# Patient Record
Sex: MALE | Race: WHITE | URBAN - METROPOLITAN AREA
[De-identification: names, ages, dates, MRNs, and addresses within clinical notes are randomized per-mention and may not be internally consistent; named-entity substitution may affect disease eponyms.]

---

## 2017-05-18 ENCOUNTER — THERAPY VISIT (OUTPATIENT)
Dept: PHYSICAL THERAPY | Facility: CLINIC | Age: 57
End: 2017-05-18
Payer: OTHER MISCELLANEOUS

## 2017-05-18 DIAGNOSIS — Z98.890 S/P SHOULDER SURGERY: ICD-10-CM

## 2017-05-18 DIAGNOSIS — M25.512 ACUTE PAIN OF LEFT SHOULDER: Primary | ICD-10-CM

## 2017-05-18 PROCEDURE — 97110 THERAPEUTIC EXERCISES: CPT | Mod: GP | Performed by: PHYSICAL THERAPIST

## 2017-05-18 PROCEDURE — 97161 PT EVAL LOW COMPLEX 20 MIN: CPT | Mod: GP | Performed by: PHYSICAL THERAPIST

## 2017-05-18 ASSESSMENT — ENCOUNTER SYMPTOMS: NECK PAIN: 0

## 2017-05-18 NOTE — LETTER
[facility]  [address]  [place] [address]  [phone]    May 25, 2017    Re: Joby Crawford   :   1960  MRN:  [id number]   REFERRING PHYSICIAN:   [facility]    Date of Initial Evaluation:  17  Visits:  Rxs Used: 1  Reason for Referral:     Acute pain of left shoulder  S/P shoulder surgery    EVALUATION SUMMARY    [facility] Initial Evaluation    Subjective:  HPI Comments: ANGELIKA 62/100     Patient is a 57 year old male presenting with rehab left shoulder hpi. The history is provided by the patient. No  was used.   Joby Crawford is a 57 year old male with a left shoulder condition.  Condition occurred with:  A fall.  Condition occurred: at work.  This is a new condition  17, surgery date. Initial fall in 2016 .    Patient reports pain:  Anterior.  Radiates to:  Shoulder.  Pain is described as aching and is intermittent and reported as 7/10.  Associated symptoms:  Loss of motion/stiffness, painful arc, loss of strength and edema. Pain is the same all the time.  Symptoms are exacerbated by using arm behind back, using arm overhead, using arm at shoulder level, lifting, lying on extremity and carrying and relieved by rest and ice.  Since onset symptoms are unchanged.  Special tests:  X-ray and MRI.      General health as reported by patient is good.  Pertinent medical history includes:  Smoking.  Medical allergies: no.  Other surgeries include:  None reported.  Current medications:  None as reported by the patient.  Current occupation is  .  Patient is currently not working due to present treatment problem.  Primary job tasks include:  Prolonged sitting, prolonged standing, lifting, driving, operating a machine and repetitive tasks.    Barriers include:  None as reported by the patient.  Red flags:  None as reported by the patient.    Objective:  Standing Alignment:     Cervical/Thoracic:  Normal and forward head  Shoulder/UE:  Protracted scapula L, scapular winging L, rounded shoulders and depressed scapula L  Neurological: He is alert. He has normal strength and normal reflexes. No cranial nerve deficit or sensory deficit.     Shoulder Evaluation:  ROM:  Strength:  not assessed  Special Tests:  not assessed  Palpation:    Left shoulder tenderness present at:  Incisional  Review of Systems   Musculoskeletal: Negative for neck pain.     Assessment/Plan:    Patient is a 57 year old male with left side shoulder complaints.    Patient has the following significant findings with corresponding treatment plan.                Diagnosis 1:  Massive L RCR     Therapy Evaluation Codes:   1) History comprised of:   Personal factors that impact the plan of care:      Age, Profession, Time since onset of symptoms and Work status.    Comorbidity factors that impact the plan of care are:      Smoking.     Medications impacting care: Pain.  2) Examination of Body Systems comprised of:   Body structures and functions that impact the plan of care:      Shoulder.   Activity limitations that impact the plan of care are:      Bathing, Bending, Cooking, Driving, Dressing, Lifting, Reading/Computer work, Working, Sleeping and Laying down.  3) Clinical presentation characteristics are:   Stable/Uncomplicated.  4) Decision-Making    Low complexity using standardized patient assessment instrument and/or measureable assessment of functional outcome.  Cumulative Therapy Evaluation is: Low complexity.  Previous and current functional limitations:  (See Goal Flow Sheet for this information)    Short term and Long term goals: (See Goal Flow Sheet for this information)     Communication ability:  Patient appears to be able to clearly communicate and understand verbal and written communication and follow directions correctly.  Treatment Explanation - The following has been discussed with the patient:   RX  ordered/plan of care  Anticipated outcomes  Possible risks and side effects  This patient would benefit from PT intervention to resume normal activities.   Rehab potential is good.    Frequency:  1 X week, once daily  Duration:  for 8 weeks  Discharge Plan:  Achieve all LTG.  Independent in home treatment program.  Reach maximal therapeutic benefit.    Thank you for your referral.    INQUIRIES  Therapist: Jose L Herrera, PT, ATC  [facility]  [address]  [place] [address]  Phone: [phone]  Fax: [phone]

## 2017-05-18 NOTE — PROGRESS NOTES
[facility] Initial Evaluation      Subjective:    HPI Comments: ANGELIKA 62/100     Patient is a 57 year old male presenting with rehab left shoulder hpi. The history is provided by the patient. No  was used.   Joby Crawford is a 57 year old male with a left shoulder condition.  Condition occurred with:  A fall.  Condition occurred: at work.  This is a new condition  2/22/17, surgery date. Initial fall in December 2016 .    Patient reports pain:  Anterior.  Radiates to:  Shoulder.  Pain is described as aching and is intermittent and reported as 7/10.  Associated symptoms:  Loss of motion/stiffness, painful arc, loss of strength and edema. Pain is the same all the time.  Symptoms are exacerbated by using arm behind back, using arm overhead, using arm at shoulder level, lifting, lying on extremity and carrying and relieved by rest and ice.  Since onset symptoms are unchanged.  Special tests:  X-ray and MRI.      General health as reported by patient is good.  Pertinent medical history includes:  Smoking.  Medical allergies: no.  Other surgeries include:  None reported.  Current medications:  None as reported by the patient.  Current occupation is  .  Patient is currently not working due to present treatment problem.  Primary job tasks include:  Prolonged sitting, prolonged standing, lifting, driving, operating a machine and repetitive tasks.    Barriers include:  None as reported by the patient.    Red flags:  None as reported by the patient.                        Objective:    Standing Alignment:    Cervical/Thoracic:  Normal and forward head  Shoulder/UE:  Protracted scapula L, scapular winging L, rounded shoulders and depressed scapula L                    Neurological: He is alert. He has normal strength and normal reflexes. No cranial nerve deficit or sensory deficit.                      Shoulder Evaluation:  ROM:          Strength:  not  assessed                        Special Tests:  not assessed      Palpation:    Left shoulder tenderness present at:  Incisional                                       General     Review of Systems   Musculoskeletal: Negative for neck pain.       Assessment/Plan:      Patient is a 57 year old male with left side shoulder complaints.    Patient has the following significant findings with corresponding treatment plan.                Diagnosis 1:  Massive L RCR       Therapy Evaluation Codes:   1) History comprised of:   Personal factors that impact the plan of care:      Age, Profession, Time since onset of symptoms and Work status.    Comorbidity factors that impact the plan of care are:      Smoking.     Medications impacting care: Pain.  2) Examination of Body Systems comprised of:   Body structures and functions that impact the plan of care:      Shoulder.   Activity limitations that impact the plan of care are:      Bathing, Bending, Cooking, Driving, Dressing, Lifting, Reading/Computer work, Working, Sleeping and Laying down.  3) Clinical presentation characteristics are:   Stable/Uncomplicated.  4) Decision-Making    Low complexity using standardized patient assessment instrument and/or measureable assessment of functional outcome.  Cumulative Therapy Evaluation is: Low complexity.    Previous and current functional limitations:  (See Goal Flow Sheet for this information)    Short term and Long term goals: (See Goal Flow Sheet for this information)     Communication ability:  Patient appears to be able to clearly communicate and understand verbal and written communication and follow directions correctly.  Treatment Explanation - The following has been discussed with the patient:   RX ordered/plan of care  Anticipated outcomes  Possible risks and side effects  This patient would benefit from PT intervention to resume normal activities.   Rehab potential is good.    Frequency:  1 X week, once daily  Duration:  for  8 weeks  Discharge Plan:  Achieve all LTG.  Independent in home treatment program.  Reach maximal therapeutic benefit.    Please refer to the daily flowsheet for treatment today, total treatment time and time spent performing 1:1 timed codes.

## 2017-05-25 ENCOUNTER — THERAPY VISIT (OUTPATIENT)
Dept: PHYSICAL THERAPY | Facility: CLINIC | Age: 57
End: 2017-05-25
Payer: OTHER MISCELLANEOUS

## 2017-05-25 DIAGNOSIS — M25.512 ACUTE PAIN OF LEFT SHOULDER: Primary | ICD-10-CM

## 2017-05-25 DIAGNOSIS — Z98.890 S/P SHOULDER SURGERY: ICD-10-CM

## 2017-05-25 PROCEDURE — 97110 THERAPEUTIC EXERCISES: CPT | Mod: GP | Performed by: PHYSICAL THERAPIST

## 2017-05-31 ENCOUNTER — THERAPY VISIT (OUTPATIENT)
Dept: PHYSICAL THERAPY | Facility: CLINIC | Age: 57
End: 2017-05-31
Payer: OTHER MISCELLANEOUS

## 2017-05-31 DIAGNOSIS — M25.512 ACUTE PAIN OF LEFT SHOULDER: Primary | ICD-10-CM

## 2017-05-31 DIAGNOSIS — Z98.890 S/P SHOULDER SURGERY: ICD-10-CM

## 2017-05-31 PROCEDURE — 97112 NEUROMUSCULAR REEDUCATION: CPT | Mod: GP | Performed by: PHYSICAL THERAPIST

## 2017-05-31 PROCEDURE — 97110 THERAPEUTIC EXERCISES: CPT | Mod: GP | Performed by: PHYSICAL THERAPIST

## 2017-06-07 ENCOUNTER — THERAPY VISIT (OUTPATIENT)
Dept: PHYSICAL THERAPY | Facility: CLINIC | Age: 57
End: 2017-06-07
Payer: OTHER MISCELLANEOUS

## 2017-06-07 DIAGNOSIS — Z98.890 S/P SHOULDER SURGERY: ICD-10-CM

## 2017-06-07 DIAGNOSIS — M25.512 ACUTE PAIN OF LEFT SHOULDER: Primary | ICD-10-CM

## 2017-06-07 PROCEDURE — 97112 NEUROMUSCULAR REEDUCATION: CPT | Mod: GP | Performed by: PHYSICAL THERAPIST

## 2017-06-07 PROCEDURE — 97110 THERAPEUTIC EXERCISES: CPT | Mod: GP | Performed by: PHYSICAL THERAPIST

## 2017-06-07 NOTE — PROGRESS NOTES
Subjective:    HPI                    Objective:    System    Physical Exam    General     ROS    Assessment/Plan:      PROGRESS  REPORT    Progress reporting period is from May 18, 2017 to June 7, 2017. 4 visits.      SUBJECTIVE   16 weeks post op Massive RCR . Overall responding well. Limited from reaching overhead, sleeping through the night and heavy lifting yet. Patient has not RTW as a  yet.    Patient notes of gradual gains of function since DC from sling.       Current Pain level: 1/10   Initial Pain level: 8/10   Changes in function: Yes, see goal flow sheet for change in function   Adverse reactions: None;   ,         OBJECTIVE  Objective: Assisted flexion 120, ER 30-40, IR 70. Advancing with post op Program appropriatey at the 4 month román. Emphasis is pain management and slow progression of ROM restoration overhead and 3-4x/week light anti gravity strengthening.       ASSESSMENT/PLAN  Updated problem list and treatment plan: Diagnosis 1:  L Massive RCR 2/2/17    STG/LTGs have been met or progress has been made towards goals:  Yes (See Goal flow sheet completed today.)  Assessment of Progress: The patient's condition is improving.  The patient's condition has potential to improve.  Self Management Plans:  Patient has been instructed in a home treatment program.  I have re-evaluated this patient and find that the nature, scope, duration and intensity of the therapy is appropriate for the medical condition of the patient.    The patient is returning to your office for a recheck appointment.    Recommendations:  This patient would benefit from continued therapy.     Frequency:  1 X week, to every other week once daily  Duration:  for 6 weeks      This patient would benefit from further evaluation.    Please refer to the daily flowsheet for treatment today, total treatment time and time spent performing 1:1 timed codes.

## 2017-06-07 NOTE — LETTER
[facility]  [address]  [place] [address]  [phone]    2017    Re: Joby Crawford   :   1960  MRN:  [id number]   REFERRING PHYSICIAN:   [facility]    Date of Initial Evaluation:  17  Visits:  Rxs Used: 4  Reason for Referral:     Acute pain of left shoulder  S/P shoulder surgery    PROGRESS  REPORT  Progress reporting period is from May 18, 2017 to 2017. 4 visits.      SUBJECTIVE   16 weeks post op Massive RCR . Overall responding well. Limited from reaching overhead, sleeping through the night and heavy lifting yet. Patient has not RTW as a  yet.    Patient notes of gradual gains of function since DC from sling.     Current Pain level: 1/10   Initial Pain level: 810   Changes in function: Yes, see goal flow sheet for change in function   Adverse reactions: None;   ,         OBJECTIVE  Objective: Assisted flexion 120, ER 30-40, IR 70. Advancing with post op Program appropriatey at the 4 month román. Emphasis is pain management and slow progression of ROM restoration overhead and 3-4x/week light anti gravity strengthening.       ASSESSMENT/PLAN  Updated problem list and treatment plan: Diagnosis 1:  L Massive RCR 17    STG/LTGs have been met or progress has been made towards goals:  Yes (See Goal flow sheet completed today.)  Assessment of Progress: The patient's condition is improving.  The patient's condition has potential to improve.  Self Management Plans:  Patient has been instructed in a home treatment program.  I have re-evaluated this patient and find that the nature, scope, duration and intensity of the therapy is appropriate for the medical condition of the patient.    The patient is returning to your office for a recheck appointment.    Recommendations:  This patient would benefit from continued therapy.     Frequency:  1 X week, to every other week once daily  Duration:  for 6 weeks    This  patient would benefit from further evaluation.    Thank you for your referral.    INQUIRIES  Therapist: Jose L Herrera [facility]  [address] [place] [address]  Phone: [phone]  Fax: [phone]

## 2017-06-14 ENCOUNTER — THERAPY VISIT (OUTPATIENT)
Dept: PHYSICAL THERAPY | Facility: CLINIC | Age: 57
End: 2017-06-14
Payer: OTHER MISCELLANEOUS

## 2017-06-14 DIAGNOSIS — Z98.890 S/P SHOULDER SURGERY: ICD-10-CM

## 2017-06-14 DIAGNOSIS — M25.512 ACUTE PAIN OF LEFT SHOULDER: Primary | ICD-10-CM

## 2017-06-14 PROCEDURE — 97110 THERAPEUTIC EXERCISES: CPT | Mod: GP | Performed by: PHYSICAL THERAPIST

## 2017-06-14 PROCEDURE — 97112 NEUROMUSCULAR REEDUCATION: CPT | Mod: GP | Performed by: PHYSICAL THERAPIST

## 2017-06-21 ENCOUNTER — THERAPY VISIT (OUTPATIENT)
Dept: PHYSICAL THERAPY | Facility: CLINIC | Age: 57
End: 2017-06-21
Payer: OTHER MISCELLANEOUS

## 2017-06-21 DIAGNOSIS — M25.512 ACUTE PAIN OF LEFT SHOULDER: Primary | ICD-10-CM

## 2017-06-21 DIAGNOSIS — Z98.890 S/P SHOULDER SURGERY: ICD-10-CM

## 2017-06-21 PROCEDURE — 97110 THERAPEUTIC EXERCISES: CPT | Mod: GP | Performed by: PHYSICAL THERAPIST

## 2017-06-21 PROCEDURE — 97112 NEUROMUSCULAR REEDUCATION: CPT | Mod: GP | Performed by: PHYSICAL THERAPIST

## 2017-06-28 ENCOUNTER — THERAPY VISIT (OUTPATIENT)
Dept: PHYSICAL THERAPY | Facility: CLINIC | Age: 57
End: 2017-06-28
Payer: OTHER MISCELLANEOUS

## 2017-06-28 DIAGNOSIS — M25.512 ACUTE PAIN OF LEFT SHOULDER: Primary | ICD-10-CM

## 2017-06-28 DIAGNOSIS — Z98.890 S/P SHOULDER SURGERY: ICD-10-CM

## 2017-06-28 PROCEDURE — 97110 THERAPEUTIC EXERCISES: CPT | Mod: GP | Performed by: PHYSICAL THERAPIST

## 2017-06-28 PROCEDURE — 97112 NEUROMUSCULAR REEDUCATION: CPT | Mod: GP | Performed by: PHYSICAL THERAPIST

## 2017-07-05 ENCOUNTER — THERAPY VISIT (OUTPATIENT)
Dept: PHYSICAL THERAPY | Facility: CLINIC | Age: 57
End: 2017-07-05
Payer: OTHER MISCELLANEOUS

## 2017-07-05 DIAGNOSIS — M25.512 ACUTE PAIN OF LEFT SHOULDER: Primary | ICD-10-CM

## 2017-07-05 DIAGNOSIS — Z98.890 S/P SHOULDER SURGERY: ICD-10-CM

## 2017-07-05 PROCEDURE — 97112 NEUROMUSCULAR REEDUCATION: CPT | Mod: GP | Performed by: PHYSICAL THERAPIST

## 2017-07-05 PROCEDURE — 97110 THERAPEUTIC EXERCISES: CPT | Mod: GP | Performed by: PHYSICAL THERAPIST

## 2017-07-05 NOTE — PROGRESS NOTES
Subjective:    HPI                    Objective:    System    Physical Exam    General     ROS    Assessment/Plan:      PROGRESS  REPORT    Progress reporting period is from June 7, 2017 to July 5, 2017.      SUBJECTIVE  Subjective: 5 months post op . SPADI improved from 65/100 to 43/100 now. Pain level, relatively low. SIgnificant limit with strength and confidence to use the L arm without protective function.      Current Pain level: 1/10   Initial Pain level: 8/10   Changes in function: Yes, see goal flow sheet for change in function   Adverse reactions: None;   ,     Patient has failed to return to therapy so current objective findings are unknown.    OBJECTIVE  Objective: Flexion 145, ER 50, IR 65.     Modified Phase 4-5 months with endurance strength mode. following Massive RCR .     Appears that patient is roughly 50-60% physiologically recovered from surgery, ROM overhead is 85-90%, IR 90%, ER 50-60% compared to the opposite side.     Overall strength at 3/5  compared to the opposite side.       ASSESSMENT/PLAN  Updated problem list and treatment plan: Diagnosis 1:  L Massive RCR     STG/LTGs have been met or progress has been made towards goals:  Yes (See Goal flow sheet completed today.)  Assessment of Progress: The patient's condition is improving.  The patient's condition has potential to improve.  Self Management Plans:  Patient has been instructed in a home treatment program.  I have re-evaluated this patient and find that the nature, scope, duration and intensity of the therapy is appropriate for the medical condition of the patient.    The patient is returning to your office for a recheck appointment.    Most likely will predict roughly 10-12% gain of function, strength and mobility each month based on the history of recovery to this stage.     Recommendations:  This patient would benefit from continued therapy.     Frequency:  1 X week, up to every other week based on follow through outside of  therapy office, once daily  Duration:  for 8 weeks         Please refer to the daily flowsheet for treatment today, total treatment time and time spent performing 1:1 timed codes.

## 2017-07-05 NOTE — LETTER
[facility]  [address]  [place] [address]  [phone]    2017    Re: Joby Crawford   :   1960  MRN:  [id number]   REFERRING PHYSICIAN:   [facility]    Date of Initial Evaluation:  17  Visits:  Rxs Used: 8  Reason for Referral:     Acute pain of left shoulder  S/P shoulder surgery    PROGRESS  REPORT  Progress reporting period is from 2017 to 2017.      SUBJECTIVE  Subjective: 5 months post op . SPADI improved from 65/100 to 43/100 now. Pain level, relatively low. SIgnificant limit with strength and confidence to use the L arm without protective function.    Current Pain level: 1/10   Initial Pain level: 8/10   Changes in function: Yes, see goal flow sheet for change in function   Adverse reactions: None;   ,     Patient has failed to return to therapy so current objective findings are unknown.    OBJECTIVE  Objective: Flexion 145, ER 50, IR 65.   Modified Phase 4-5 months with endurance strength mode. following Massive RCR .   Appears that patient is roughly 50-60% physiologically recovered from surgery, ROM overhead is 85-90%, IR 90%, ER 50-60% compared to the opposite side.   Overall strength at 3/5  compared to the opposite side.       ASSESSMENT/PLAN  Updated problem list and treatment plan: Diagnosis 1:  L Massive RCR     STG/LTGs have been met or progress has been made towards goals:  Yes (See Goal flow sheet completed today.)  Assessment of Progress: The patient's condition is improving.  The patient's condition has potential to improve.  Self Management Plans:  Patient has been instructed in a home treatment program.  I have re-evaluated this patient and find that the nature, scope, duration and intensity of the therapy is appropriate for the medical condition of the patient.  The patient is returning to your office for a recheck appointment.  Most likely will predict roughly 10-12% gain of function,  strength and mobility each month based on the history of recovery to this stage.     Recommendations:  This patient would benefit from continued therapy.     Frequency:  1 X week, up to every other week based on follow through outside of therapy office, once daily  Duration:  for 8 weeks     Thank you for your referral.    INQUIRIES  Therapist: Jose L Herrera [facility]  [address]  [place] [address]  Phone: [phone]  Fax: [phone]

## 2017-07-26 ENCOUNTER — THERAPY VISIT (OUTPATIENT)
Dept: PHYSICAL THERAPY | Facility: CLINIC | Age: 57
End: 2017-07-26
Payer: OTHER MISCELLANEOUS

## 2017-07-26 DIAGNOSIS — Z98.890 S/P SHOULDER SURGERY: ICD-10-CM

## 2017-07-26 DIAGNOSIS — M25.512 ACUTE PAIN OF LEFT SHOULDER: Primary | ICD-10-CM

## 2017-07-26 PROCEDURE — 97112 NEUROMUSCULAR REEDUCATION: CPT | Mod: GP | Performed by: PHYSICAL THERAPIST

## 2017-07-26 PROCEDURE — 97110 THERAPEUTIC EXERCISES: CPT | Mod: GP | Performed by: PHYSICAL THERAPIST

## 2017-07-31 ENCOUNTER — TELEPHONE (OUTPATIENT)
Dept: PHYSICAL THERAPY | Facility: CLINIC | Age: 57
End: 2017-07-31

## 2017-08-02 ENCOUNTER — THERAPY VISIT (OUTPATIENT)
Dept: PHYSICAL THERAPY | Facility: CLINIC | Age: 57
End: 2017-08-02
Payer: OTHER MISCELLANEOUS

## 2017-08-02 DIAGNOSIS — M25.512 ACUTE PAIN OF LEFT SHOULDER: Primary | ICD-10-CM

## 2017-08-02 PROCEDURE — 97110 THERAPEUTIC EXERCISES: CPT | Mod: GP | Performed by: PHYSICAL THERAPIST

## 2017-08-02 PROCEDURE — 97140 MANUAL THERAPY 1/> REGIONS: CPT | Mod: GP | Performed by: PHYSICAL THERAPIST

## 2017-08-16 ENCOUNTER — THERAPY VISIT (OUTPATIENT)
Dept: PHYSICAL THERAPY | Facility: CLINIC | Age: 57
End: 2017-08-16
Payer: OTHER MISCELLANEOUS

## 2017-08-16 DIAGNOSIS — M25.512 ACUTE PAIN OF LEFT SHOULDER: Primary | ICD-10-CM

## 2017-08-16 DIAGNOSIS — Z98.890 S/P SHOULDER SURGERY: ICD-10-CM

## 2017-08-16 PROCEDURE — 97110 THERAPEUTIC EXERCISES: CPT | Mod: GP | Performed by: PHYSICAL THERAPIST

## 2017-08-16 PROCEDURE — 97112 NEUROMUSCULAR REEDUCATION: CPT | Mod: GP | Performed by: PHYSICAL THERAPIST

## 2017-08-16 NOTE — PROGRESS NOTES
Subjective:    HPI                    Objective:    System    Physical Exam    General     ROS    Assessment/Plan:      PROGRESS  REPORT    Progress reporting period is from July 5, 2017 to August 16, 2017.     SUBJECTIVE  Subjective: 6 months post op L Massive  RCR.     Follow through therapy every other week for gradual progression of endurance strength and gentle 4 corner ROM  ROM is very slow to restore.      SPADI 46/100, roughly the same as 1 month ago.       Current Pain level: 1/10   Initial Pain level: 8/10   Changes in function: Yes, see goal flow sheet for change in function   Adverse reactions: None;   ,         OBJECTIVE  Objective: IR 80-90, ER 45, Flexion 150-155,, posterior reach T8. weak with discomffort ABduction, IR, and ER.  Roughly 10% increase of total shoulder ROM compared to a month ago.     HEP: gentle 4 corner ROM daily.  , supine RTC program with emphasis to RTC high reps 3x/week      ASSESSMENT/PLAN  Updated problem list and treatment plan: Diagnosis 1:  L shoulder MAssive RCR       Assessment of Progress: The patient's progress has slowed.  Self Management Plans:  Patient has been instructed in a home treatment program.    The patient is returning to your office for a recheck appointment.    Recommendations:  This patient would benefit from further evaluation.    Please refer to the daily flowsheet for treatment today, total treatment time and time spent performing 1:1 timed codes.

## 2017-08-16 NOTE — LETTER
[facility]  [address] [place] [id number] [phone]    2017    Re: Joby Crawford   :   1960  MRN:  [id number]   REFERRING PHYSICIAN:   [facility]    Date of Initial Evaluation:  17  Visits:  Rxs Used: 3 ()  Reason for Referral:     Acute pain of left shoulder  S/P shoulder surgery    PROGRESS  REPORT  Progress reporting period is from 2017 to 2017.     SUBJECTIVE  Subjective: 6 months post op L Massive  RCR.   Follow through therapy every other week for gradual progression of endurance strength and gentle 4 corner ROM  ROM is very slow to restore.    SPADI 46/100, roughly the same as 1 month ago.     Current Pain level: 1/10   Initial Pain level: 8/10   Changes in function: Yes, see goal flow sheet for change in function   Adverse reactions: None    OBJECTIVE  Objective: IR 80-90, ER 45, Flexion 150-155,, posterior reach T8. weak with discomffort ABduction, IR, and ER.  Roughly 10% increase of total shoulder ROM compared to a month ago.   HEP: gentle 4 corner ROM daily.  supine RTC program with emphasis to RTC high reps 3x/week      ASSESSMENT/PLAN  Updated problem list and treatment plan: Diagnosis 1:  L shoulder MAssive RCR   Assessment of Progress: The patient's progress has slowed.  Self Management Plans:  Patient has been instructed in a home treatment program.  The patient is returning to your office for a recheck appointment.    Recommendations:  This patient would benefit from further evaluation.    Thank you for your referral.    INQUIRIES  Therapist: Jose L Herrera PT [facility]  [address]  [place] [address]  Phone: [phone]  Fax: [phone]

## 2017-08-23 ENCOUNTER — THERAPY VISIT (OUTPATIENT)
Dept: PHYSICAL THERAPY | Facility: CLINIC | Age: 57
End: 2017-08-23
Payer: OTHER MISCELLANEOUS

## 2017-08-23 DIAGNOSIS — Z98.890 S/P SHOULDER SURGERY: ICD-10-CM

## 2017-08-23 DIAGNOSIS — M25.512 ACUTE PAIN OF LEFT SHOULDER: Primary | ICD-10-CM

## 2017-08-23 PROCEDURE — 97140 MANUAL THERAPY 1/> REGIONS: CPT | Mod: GP | Performed by: PHYSICAL THERAPIST

## 2017-08-23 PROCEDURE — 97112 NEUROMUSCULAR REEDUCATION: CPT | Mod: GP | Performed by: PHYSICAL THERAPIST

## 2017-08-23 PROCEDURE — 97110 THERAPEUTIC EXERCISES: CPT | Mod: GP | Performed by: PHYSICAL THERAPIST

## 2017-08-28 ENCOUNTER — THERAPY VISIT (OUTPATIENT)
Dept: PHYSICAL THERAPY | Facility: CLINIC | Age: 57
End: 2017-08-28
Payer: OTHER MISCELLANEOUS

## 2017-08-28 DIAGNOSIS — M25.512 ACUTE PAIN OF LEFT SHOULDER: Primary | ICD-10-CM

## 2017-08-28 DIAGNOSIS — Z98.890 S/P SHOULDER SURGERY: ICD-10-CM

## 2017-08-28 PROCEDURE — 97112 NEUROMUSCULAR REEDUCATION: CPT | Mod: GP | Performed by: PHYSICAL THERAPIST

## 2017-08-28 PROCEDURE — 97110 THERAPEUTIC EXERCISES: CPT | Mod: GP | Performed by: PHYSICAL THERAPIST

## 2017-09-13 ENCOUNTER — THERAPY VISIT (OUTPATIENT)
Dept: PHYSICAL THERAPY | Facility: CLINIC | Age: 57
End: 2017-09-13
Payer: OTHER MISCELLANEOUS

## 2017-09-13 DIAGNOSIS — M25.512 ACUTE PAIN OF LEFT SHOULDER: Primary | ICD-10-CM

## 2017-09-13 DIAGNOSIS — Z98.890 S/P SHOULDER SURGERY: ICD-10-CM

## 2017-09-13 PROCEDURE — 97112 NEUROMUSCULAR REEDUCATION: CPT | Mod: GP | Performed by: PHYSICAL THERAPIST

## 2017-09-13 PROCEDURE — 97110 THERAPEUTIC EXERCISES: CPT | Mod: GP | Performed by: PHYSICAL THERAPIST

## 2017-09-13 PROCEDURE — 97140 MANUAL THERAPY 1/> REGIONS: CPT | Mod: GP | Performed by: PHYSICAL THERAPIST

## 2017-09-27 ENCOUNTER — THERAPY VISIT (OUTPATIENT)
Dept: PHYSICAL THERAPY | Facility: CLINIC | Age: 57
End: 2017-09-27
Payer: OTHER MISCELLANEOUS

## 2017-09-27 DIAGNOSIS — M25.512 ACUTE PAIN OF LEFT SHOULDER: Primary | ICD-10-CM

## 2017-09-27 DIAGNOSIS — Z98.890 S/P SHOULDER SURGERY: ICD-10-CM

## 2017-09-27 PROCEDURE — 97110 THERAPEUTIC EXERCISES: CPT | Mod: GP | Performed by: PHYSICAL THERAPIST

## 2017-09-27 PROCEDURE — 97112 NEUROMUSCULAR REEDUCATION: CPT | Mod: GP | Performed by: PHYSICAL THERAPIST

## 2017-09-27 NOTE — PROGRESS NOTES
Subjective:    HPI                    Objective:    System    Physical Exam    General     ROS    Assessment/Plan:      PROGRESS  REPORT    Progress reporting period is from August 16, 2017  to September 27, 2017.     SUBJECTIVE    6-7 months post op Massive RCR . Overall significant improvement with day to day activity. Weak and limited overhead reach, backside reach  and repeated lifting or heavy lifting above 20# patient reports.       Current Pain level: 0/10   Initial Pain level: 8/10   Changes in function: Yes, see goal flow sheet for change in function   Adverse reactions: None;   ,         OBJECTIVE    Floor to waist lift     20-30# 1 RM   Waist to Shoulder Lift     15-20# 1 RM   No lift testing at shoulder or above level.         ROM gradually improved to 90 IR, 60 ER, 150-160 flexion, posterior reach T 8 vs T4.     Continues gentle 4 corner ROM, gradual progression of RTC/scap strength, functional simulation lifting.      Suggest functional lifting, advancing RTC strength to standing vs laying unless compensation pattern or MD consult different.              ASSESSMENT/PLAN  Updated problem list and treatment plan: Diagnosis 1:  L shoulder Massive RCR     STG/LTGs have been met or progress has been made towards goals:  Yes (See Goal flow sheet completed today.)  Assessment of Progress: The patient's condition is improving.  The patient's condition has potential to improve.  Self Management Plans:  Patient has been instructed in a home treatment program.  I have re-evaluated this patient and find that the nature, scope, duration and intensity of the therapy is appropriate for the medical condition of the patient.  Joby continues to require the following intervention to meet STG and LTG's: PT  The patient is returning to your office for a recheck appointment.    Recommendations:  This patient would benefit from further evaluation.    Recommend functional Lifting, advance RTC/SCap endurance strength toward  full unrestricted self care and day to day activity function is restored, at that point, possible work conditioning program , based on MD consult directive.     Please refer to the daily flowsheet for treatment today, total treatment time and time spent performing 1:1 timed codes.

## 2017-09-27 NOTE — LETTER
[facility]  [address]  [place] [address]  [phone]    2017    Re: Joby Crawford   :   1960  MRN:  [id number]   REFERRING PHYSICIAN:   [facility]    Date of Initial Evaluation:  17  Visits:     Reason for Referral:     Acute pain of left shoulder  S/P shoulder surgery    PROGRESS  REPORT  Progress reporting period is from 2017  to 2017.     SUBJECTIVE  6-7 months post op Massive RCR . Overall significant improvement with day to day activity. Weak and limited overhead reach, backside reach  and repeated lifting or heavy lifting above 20# patient reports.   Current Pain level: 0/10   Initial Pain level: 8/10   Changes in function: Yes, see goal flow sheet for change in function   Adverse reactions: None      OBJECTIVE  Floor to waist lift     20-30# 1 RM   Waist to Shoulder Lift     15-20# 1 RM   No lift testing at shoulder or above level.   ROM gradually improved to 90 IR, 60 ER, 150-160 flexion, posterior reach T 8 vs T4.   Continues gentle 4 corner ROM, gradual progression of RTC/scap strength, functional simulation lifting.    Suggest functional lifting, advancing RTC strength to standing vs laying unless compensation pattern or MD consult different.     ASSESSMENT/PLAN  Updated problem list and treatment plan: Diagnosis 1:  L shoulder Massive RCR     STG/LTGs have been met or progress has been made towards goals:  Yes (See Goal flow sheet completed today.)  Assessment of Progress: The patient's condition is improving.  The patient's condition has potential to improve.  Self Management Plans:  Patient has been instructed in a home treatment program.  I have re-evaluated this patient and find that the nature, scope, duration and intensity of the therapy is appropriate for the medical condition of the patient.  Joby continues to require the following intervention to meet STG and LTG's: PT  The  patient is returning to your office for a recheck appointment.    Recommendations:  This patient would benefit from further evaluation.    Recommend functional Lifting, advance RTC/SCap endurance strength toward full unrestricted self care and day to day activity function is restored, at that point, possible work conditioning program , based on MD consult directive.     Thank you for your referral.    INQUIRIES  Therapist: [facility]  [address] [place] [address]  Phone: [phone]  Fax: [phone]

## 2017-10-09 ENCOUNTER — THERAPY VISIT (OUTPATIENT)
Dept: PHYSICAL THERAPY | Facility: CLINIC | Age: 57
End: 2017-10-09
Payer: OTHER MISCELLANEOUS

## 2017-10-09 DIAGNOSIS — M25.512 ACUTE PAIN OF LEFT SHOULDER: Primary | ICD-10-CM

## 2017-10-09 DIAGNOSIS — Z98.890 S/P SHOULDER SURGERY: ICD-10-CM

## 2017-10-09 PROCEDURE — 97110 THERAPEUTIC EXERCISES: CPT | Mod: GP | Performed by: PHYSICAL THERAPIST

## 2017-10-09 PROCEDURE — 97112 NEUROMUSCULAR REEDUCATION: CPT | Mod: GP | Performed by: PHYSICAL THERAPIST

## 2017-10-30 ENCOUNTER — THERAPY VISIT (OUTPATIENT)
Dept: PHYSICAL THERAPY | Facility: CLINIC | Age: 57
End: 2017-10-30
Payer: OTHER MISCELLANEOUS

## 2017-10-30 DIAGNOSIS — Z98.890 S/P SHOULDER SURGERY: ICD-10-CM

## 2017-10-30 DIAGNOSIS — M25.512 ACUTE PAIN OF LEFT SHOULDER: Primary | ICD-10-CM

## 2017-10-30 PROCEDURE — 97112 NEUROMUSCULAR REEDUCATION: CPT | Mod: GP | Performed by: PHYSICAL THERAPIST

## 2017-10-30 PROCEDURE — 97110 THERAPEUTIC EXERCISES: CPT | Mod: GP | Performed by: PHYSICAL THERAPIST

## 2017-10-30 NOTE — PROGRESS NOTES
Subjective:    HPI                    Objective:    System    Physical Exam    General     ROS    Assessment/Plan:      PROGRESS  REPORT    Progress reporting period is from Sept 27, 2107 to October 30, 2017. .     SUBJECTIVE   Luis Fernando is 9 months post op Massive RCR Repair . Patient has not RTW at this point. He is fully functional with self cares and day to day function.     SPADI improved from 56/100 to 36/100 this month.     Most limitation is terminal reach overhead and lifting to shoulder height or away from the body      Current Pain level: 0/10   Initial Pain level: 8/10   Changes in function: Yes, see goal flow sheet for change in function   Adverse reactions: None;   ,         OBJECTIVE  Objective: IR 70, ER 50, Asisted flexion ROM  150 vs 160., R shoulder ER 90, IR 80. Overal ROM is 95% restored. Endurance Strength roughly 50% of the opposite side. .    Significant scap substitution with flexion and especially abduction motion.      Floor to waist lift 75#,   waist to shoulder lift 5#,   no weight overhead.     Outward reach to 15#      ASSESSMENT/PLAN  Updated problem list and treatment plan: Diagnosis 1:  L massive RCR     STG/LTGs have been met or progress has been made towards goals:  Yes (See Goal flow sheet completed today.)  Assessment of Progress: The patient's condition is improving.  The patient's condition has potential to improve.  Self Management Plans:  Patient has been instructed in a home treatment program.  I have re-evaluated this patient and find that the nature, scope, duration and intensity of the therapy is appropriate for the medical condition of the patient.        Recommendations:  This patient would benefit from further evaluation.    Please refer to the daily flowsheet for treatment today, total treatment time and time spent performing 1:1 timed codes.

## 2017-10-30 NOTE — LETTER
[facility]  [address]  [place] [address]  [phone]    2017    Re: Joby Crawford   :   1960  MRN:  [id number]   REFERRING PHYSICIAN:   [facility]    Date of Initial Evaluation:  17  Visits:  Rxs Used:   Reason for Referral:     Acute pain of left shoulder  S/P shoulder surgery    PROGRESS  REPORT  Progress reporting period is from 2107 to 2017. .     SUBJECTIVE   Luis Fernando is 9 months post op Massive RCR Repair . Patient has not RTW at this point. He is fully functional with self cares and day to day function.     SPADI improved from 56/100 to 36/100 this month.   Most limitation is terminal reach overhead and lifting to shoulder height or away from the body      Current Pain level: 0/10   Initial Pain level: 8/10   Changes in function: Yes, see goal flow sheet for change in function   Adverse reactions: None    OBJECTIVE  Objective: IR 70, ER 50, Asisted flexion ROM  150 vs 160., R shoulder ER 90, IR 80. Overal ROM is 95% restored. Endurance Strength roughly 50% of the opposite side. .  Significant scap substitution with flexion and especially abduction motion.      Floor to waist lift 75#,   waist to shoulder lift 5#,   no weight overhead.   Outward reach to 15#      ASSESSMENT/PLAN  Updated problem list and treatment plan: Diagnosis 1:  L massive RCR     STG/LTGs have been met or progress has been made towards goals:  Yes (See Goal flow sheet completed today.)  Assessment of Progress: The patient's condition is improving.  The patient's condition has potential to improve.  Self Management Plans:  Patient has been instructed in a home treatment program.  I have re-evaluated this patient and find that the nature, scope, duration and intensity of the therapy is appropriate for the medical condition of the patient.    Recommendations:  This patient would benefit from further evaluation.    Thank you for  your referral.    INQUIRIES  Therapist: [facility] L [facility]  [address]  [place] [address]  Phone: [phone]  Fax: [phone]

## 2017-11-20 ENCOUNTER — TELEPHONE (OUTPATIENT)
Dept: PHYSICAL THERAPY | Facility: CLINIC | Age: 57
End: 2017-11-20

## 2017-11-20 ENCOUNTER — THERAPY VISIT (OUTPATIENT)
Dept: PHYSICAL THERAPY | Facility: CLINIC | Age: 57
End: 2017-11-20
Payer: OTHER MISCELLANEOUS

## 2017-11-20 DIAGNOSIS — M25.512 ACUTE PAIN OF LEFT SHOULDER: Primary | ICD-10-CM

## 2017-11-20 DIAGNOSIS — Z98.890 S/P SHOULDER SURGERY: ICD-10-CM

## 2017-11-20 PROCEDURE — 97110 THERAPEUTIC EXERCISES: CPT | Mod: GP | Performed by: PHYSICAL THERAPIST

## 2017-11-20 PROCEDURE — 97112 NEUROMUSCULAR REEDUCATION: CPT | Mod: GP | Performed by: PHYSICAL THERAPIST

## 2017-11-20 NOTE — TELEPHONE ENCOUNTER
Philomena at [facility] was contacted via fax [phone] for PT request authorization 4 visits     MD referral and progress note had been sent as well.

## 2017-12-12 ENCOUNTER — THERAPY VISIT (OUTPATIENT)
Dept: PHYSICAL THERAPY | Facility: CLINIC | Age: 57
End: 2017-12-12
Payer: OTHER MISCELLANEOUS

## 2017-12-12 DIAGNOSIS — M25.512 ACUTE PAIN OF LEFT SHOULDER: Primary | ICD-10-CM

## 2017-12-12 DIAGNOSIS — Z98.890 S/P SHOULDER SURGERY: ICD-10-CM

## 2017-12-12 PROCEDURE — 97530 THERAPEUTIC ACTIVITIES: CPT | Mod: GP | Performed by: PHYSICAL THERAPIST

## 2017-12-12 PROCEDURE — 97110 THERAPEUTIC EXERCISES: CPT | Mod: GP | Performed by: PHYSICAL THERAPIST

## 2017-12-12 NOTE — PROGRESS NOTES
Subjective:    HPI                    Objective:    System    Physical Exam    General     ROS    Assessment/Plan:      PROGRESS  REPORT    Progress reporting period is from 10/30/17 to 12/12/17. 10 months post .     SUBJECTIVE   SPADI improved from 35/100 a month ago to 20/100 today.     10 months post op Massive RCR . Minimal to no pain. Sleeping well at night. Full self cares and day to day function.     Terminal reach overhead and lifting away from the body is is greatest limitation.      Has not RTW at this point in any capacity.     Current Pain level: 0/10   Initial Pain level: 8/10        ;   ,       OBJECTIVE     Functional   Patient has met goals of lifting floor to waist with tolerance to 75#   waist to shoulder 25#,   outward reach 20#,   overhead reach 3-4# and gross substitution.    ROM    AROM overhead reach 150 R, 160 L,   ER 60 R, 90 L,   IR 75 R, 90 L ,   posterior reach T10 R, T8 L.    Strength   Overall endurance strength improved from 60% to 70% over the month.    Rehab intervention 3x/week on his own with a blend of RTC, scap, endurance strength as well as gentle capsular ROM      Follow up plan to consult MD within 1-2 weeks       ASSESSMENT/PLAN  Updated problem list and treatment plan: Diagnosis 1:  L Massive RCR   Decreased ROM/flexibility - manual therapy and therapeutic exercise  Decreased strength - therapeutic exercise and therapeutic activities  STG/LTGs have been met or progress has been made towards goals:  Yes (See Goal flow sheet completed today.)  Assessment of Progress: The patient's condition is improving.  The patient's condition has potential to improve.  Self Management Plans:  Patient has been instructed in a home treatment program.        Recommendations:  This patient would benefit from further evaluation.    Please refer to the daily flowsheet for treatment today, total treatment time and time spent performing 1:1 timed codes.

## 2017-12-12 NOTE — LETTER
[facility]  [address]  [place] [address]  [phone]    2017    Re: Joby Crawford   :   1960  MRN:  [id number]   REFERRING PHYSICIAN:   [facility]    Date of Initial Evaluation:  17  Visits:  Rxs Used:  (19 total)  Reason for Referral:     Acute pain of left shoulder  S/P shoulder surgery    PROGRESS  REPORT  Progress reporting period is from 10/30/17 to 17. 10 months post .     SUBJECTIVE   SPADI improved from 35/100 a month ago to 20/100 today.   10 months post op Massive RCR . Minimal to no pain. Sleeping well at night. Full self cares and day to day function.   Terminal reach overhead and lifting away from the body is is greatest limitation.    Has not RTW at this point in any capacity.   Current Pain level: 0/10   Initial Pain level: 8/10     OBJECTIVE     Functional   Patient has met goals of lifting floor to waist with tolerance to 75#   waist to shoulder 25#,   outward reach 20#,   overhead reach 3-4# and gross substitution.    ROM    AROM overhead reach 150 R, 160 L,   ER 60 R, 90 L,   IR 75 R, 90 L ,   posterior reach T10 R, T8 L.    Strength   Overall endurance strength improved from 60% to 70% over the month.  Rehab intervention 3x/week on his own with a blend of RTC, scap, endurance strength as well as gentle capsular ROM    Follow up plan to consult MD within 1-2 weeks       ASSESSMENT/PLAN  Updated problem list and treatment plan: Diagnosis 1:  L Massive RCR   Decreased ROM/flexibility - manual therapy and therapeutic exercise  Decreased strength - therapeutic exercise and therapeutic activities  STG/LTGs have been met or progress has been made towards goals:  Yes (See Goal flow sheet completed today.)  Assessment of Progress: The patient's condition is improving.  The patient's condition has potential to improve.  Self Management Plans:  Patient has been instructed in a home treatment  program.    Recommendations:  This patient would benefit from further evaluation.    Thank you for your referral.    INQUIRIES  Therapist: [facility] L [facility]  [address] [place] [address]  Phone: [phone]  Fax: [phone]

## 2018-01-02 ENCOUNTER — THERAPY VISIT (OUTPATIENT)
Dept: PHYSICAL THERAPY | Facility: CLINIC | Age: 58
End: 2018-01-02
Payer: OTHER MISCELLANEOUS

## 2018-01-02 DIAGNOSIS — Z98.890 S/P SHOULDER SURGERY: ICD-10-CM

## 2018-01-02 DIAGNOSIS — M25.512 ACUTE PAIN OF LEFT SHOULDER: Primary | ICD-10-CM

## 2018-01-02 PROCEDURE — 97110 THERAPEUTIC EXERCISES: CPT | Mod: GP | Performed by: PHYSICAL THERAPIST

## 2018-01-02 PROCEDURE — 97530 THERAPEUTIC ACTIVITIES: CPT | Mod: GP | Performed by: PHYSICAL THERAPIST

## 2018-01-02 PROCEDURE — 97112 NEUROMUSCULAR REEDUCATION: CPT | Mod: GP | Performed by: PHYSICAL THERAPIST

## 2018-01-29 ENCOUNTER — THERAPY VISIT (OUTPATIENT)
Dept: PHYSICAL THERAPY | Facility: CLINIC | Age: 58
End: 2018-01-29
Payer: OTHER MISCELLANEOUS

## 2018-01-29 DIAGNOSIS — Z98.890 S/P SHOULDER SURGERY: ICD-10-CM

## 2018-01-29 DIAGNOSIS — M25.512 ACUTE PAIN OF LEFT SHOULDER: Primary | ICD-10-CM

## 2018-01-29 PROCEDURE — 97112 NEUROMUSCULAR REEDUCATION: CPT | Mod: GP | Performed by: PHYSICAL THERAPIST

## 2018-01-29 PROCEDURE — 97110 THERAPEUTIC EXERCISES: CPT | Mod: GP | Performed by: PHYSICAL THERAPIST

## 2018-01-29 NOTE — LETTER
[facility]  [address] [place] [address]  [phone]    2018    Re: Joby Crawford   :   1960  MRN:  [id number]   REFERRING PHYSICIAN:   [facility]    Date of Initial Evaluation:  17  Visits:  Rxs Used: 21 ( 3/6 )  Reason for Referral:     Acute pain of left shoulder  S/P shoulder surgery    PROGRESS  REPORT  Progress reporting period is from Dec 12, 2017 to  .     SUBJECTIVE  No significant change of function or pain according to patient subjective report.   He has not RTW at this point.   Most limitation from heavy lifting or use of his arm away or overhead position . SPADI no change from a month ago 27/100 , which is actually slight lower.    Current Pain level: 0/10   Initial Pain level: 8/10   Changes in function: No changes noted in function since last SOAP note   Adverse reactions: None    OBJECTIVE   IR 70-75, 70 ER , AROM  155 flexion, 130 abduction with gross substitution.   MMT strength ER and abduction 3/5 strength .   ENdurance strength improved by 15-20% RTC .   1 RM lifting floor to waist improved to 80-90#,   1RM waist to shoulder lift remained the same at 50#,   Outward reach 1 RM improved from 20-25#,   overhead reach to 3-4# L, 10# R.   However. when arm movement at 90 degrees or higher, key has a gross substitution and rop off in strength. Follow up with MD as planned within the week for consult.       ASSESSMENT/PLAN  Updated problem list and treatment plan: Diagnosis 1:  L shoulder RCR     STG/LTGs have been met or progress has been made towards goals:  None  Assessment of Progress: The patient's progress has plateaued.  Self Management Plans:  Patient has been instructed in a home treatment program.    Recommendations:  This patient would benefit from further evaluation.    Thank you for your referral.    INQUIRIES  Therapist: [facility] L [facility]  [address]  [place] [address]  Phone: [phone]  Fax: [phone]

## 2018-01-29 NOTE — PROGRESS NOTES
Subjective:  HPI                    Objective:  System    Physical Exam    General     ROS    Assessment/Plan:    PROGRESS  REPORT    Progress reporting period is from Dec 12, 2017 to January 29. 2018 .     SUBJECTIVE  No significant change of function or pain according to patient subjective report.     He has not RTW at this point.     Most limitation from heavy lifting or use of his arm away or overhead position . SPADI no change from a month ago 27/100 , which is actually slight lower.        Current Pain level: 0/10   Initial Pain level: 8/10   Changes in function: No changes noted in function since last SOAP note   Adverse reactions: None;   ,         OBJECTIVE   IR 70-75, 70 ER , AROM  155 flexion, 130 abduction with gross substitution.     MMT strength ER and abduction 3/5 strength .   ENdurance strength improved by 15-20% RTC .     1 RM lifting floor to waist improved to 80-90#,   1RM waist to shoulder lift remained the same at 50#,   Outward reach 1 RM improved from 20-25#,   overhead reach to 3-4# L, 10# R.     However. when arm movement at 90 degrees or higher, key has a gross substitution and rop off in strength. Follow up with MD as planned within the week for consult.       ASSESSMENT/PLAN  Updated problem list and treatment plan: Diagnosis 1:  L shoulder RCR     STG/LTGs have been met or progress has been made towards goals:  None  Assessment of Progress: The patient's progress has plateaued.  Self Management Plans:  Patient has been instructed in a home treatment program.      Recommendations:  This patient would benefit from further evaluation.    Please refer to the daily flowsheet for treatment today, total treatment time and time spent performing 1:1 timed codes.

## 2018-02-26 ENCOUNTER — THERAPY VISIT (OUTPATIENT)
Dept: PHYSICAL THERAPY | Facility: CLINIC | Age: 58
End: 2018-02-26
Payer: OTHER MISCELLANEOUS

## 2018-02-26 ENCOUNTER — TELEPHONE (OUTPATIENT)
Dept: PHYSICAL THERAPY | Facility: CLINIC | Age: 58
End: 2018-02-26

## 2018-02-26 DIAGNOSIS — M25.512 ACUTE PAIN OF LEFT SHOULDER: Primary | ICD-10-CM

## 2018-02-26 DIAGNOSIS — Z98.890 S/P SHOULDER SURGERY: ICD-10-CM

## 2018-02-26 PROCEDURE — 97110 THERAPEUTIC EXERCISES: CPT | Mod: GP | Performed by: PHYSICAL THERAPIST

## 2018-02-26 PROCEDURE — 97112 NEUROMUSCULAR REEDUCATION: CPT | Mod: GP | Performed by: PHYSICAL THERAPIST

## 2018-02-26 NOTE — LETTER
[facility]  [address]  [place] [address]  [phone]    2018    Re: Joby Crawford   :   1960  MRN:  [id number]   REFERRING PHYSICIAN:   [facility]    Date of Initial Evaluation:  17  Visits:  Rxs Used:   Reason for Referral:     Acute pain of left shoulder  S/P shoulder surgery    PROGRESS  REPORT  Progress reporting period is from 2018 to 2018. Luis Fernando has been seen in PT for a total of 22 visits since post op RCR and open biceps tenodesis surgery 17, 1 year ago.     SUBJECTIVE  Luis Fernando returns to therapy today for his final authorized visit: He followed up with his MD 2 weeks ago, ordered a Functional Capicity Evaluation at [facility]. It is to be scheduled by his [facility] and  insurance company according to the patient. Luis Fernando has not RTW on any capacity at this point. Patient is painfree at rest. Greatest functional limit is overhead reach or lifting.   SPADI is improved to 20/100  Current Pain level: 0/10   Initial Pain level: 8/10   Changes in function: Yes, see goal flow sheet for change in function   Adverse reactions: None      OBJECTIVE  Objective: IR 80, ER 65, Flexion 160 assisted.   Gross scap hike is still noted  At shoulder height and above.    Functional Capacity assessment is to be assessed within the next 2-3 weeks.   MD follow up in conclusion of that test.   Patient continues to make gains with function, ROM and  Strength over time. Emphasis to ROM over the next couple weeks will be goal based on strong RTC. Most limited is overall capsular mobility and scap stability   Consider DC within the month from PT and transition to work conditioning program unless directed by MD eulalia.     ASSESSMENT/PLAN  Updated problem list and treatment plan: Diagnosis 1:  L RCR , Bicep Tenodesis     STG/LTGs have been met or progress has been made towards goals:  Yes (See Goal flow sheet completed  today.)  Assessment of Progress: The patient's condition has potential to improve.  Self Management Plans:  Patient has been instructed in a home treatment program.    Recommendations:  This patient would benefit from further evaluation.    Thank you for your referral.    INQUIRIES  Therapist: Jose L [facility]  [address] [place] [address]  Phone: [phone]  Fax: [phone]

## 2018-02-26 NOTE — TELEPHONE ENCOUNTER
PT request 2 visits over the next 6-8 weeks based on functional limits, awaiting Functional capacity evaluation and directive of MD

## 2018-02-26 NOTE — PROGRESS NOTES
Subjective:  HPI                    Objective:  System    Physical Exam    General     ROS    Assessment/Plan:    PROGRESS  REPORT    Progress reporting period is from Jan 29, 2018 to Feb 26, 2018. Luis Fernando has been seen in PT for a total of 22 visits since post op RCR and open biceps tenodesis surgery 2/2/17, 1 year ago.     SUBJECTIVE  Luis Fernando returns to therapy today for his final authorized visit: He followed up with his MD 2 weeks ago, ordered a Functional Capicity Evaluation at [facility]. It is to be scheduled by his [facility] and  insurance company according to the patient. Luis Fernando has not RTW on any capacity at this point. Patient is painfree at rest. Greatest functional limit is overhead reach or lifting.     SPADI is improved to 20/100      Current Pain level: 0/10   Initial Pain level: 8/10   Changes in function: Yes, see goal flow sheet for change in function   Adverse reactions: None;   ,         OBJECTIVE  Objective: IR 80, ER 65, Flexion 160 assisted.     Gross scap hike is still noted  At shoulder height and above.      Functional Capacity assessment is to be assessed within the next 2-3 weeks.     MD follow up in conclusion of that test.     Patient continues to make gains with function, ROM and  Strength over time. Emphasis to ROM over the next couple weeks will be goal based on strong RTC. Most limited is overall capsular mobility and scap stability     Consider DC within the month from PT and transition to work conditioning program unless directed by MD esteban.   ASSESSMENT/PLAN  Updated problem list and treatment plan: Diagnosis 1:  L RCR , Bicep Tenodesis     STG/LTGs have been met or progress has been made towards goals:  Yes (See Goal flow sheet completed today.)  Assessment of Progress: The patient's condition has potential to improve.  Self Management Plans:  Patient has been instructed in a home treatment program.      Recommendations:  This patient would benefit from further  evaluation.    Please refer to the daily flowsheet for treatment today, total treatment time and time spent performing 1:1 timed codes.

## 2018-03-19 ENCOUNTER — THERAPY VISIT (OUTPATIENT)
Dept: PHYSICAL THERAPY | Facility: CLINIC | Age: 58
End: 2018-03-19
Payer: OTHER MISCELLANEOUS

## 2018-03-19 DIAGNOSIS — M25.512 ACUTE PAIN OF LEFT SHOULDER: Primary | ICD-10-CM

## 2018-03-19 DIAGNOSIS — Z98.890 S/P SHOULDER SURGERY: ICD-10-CM

## 2018-03-19 PROCEDURE — 97530 THERAPEUTIC ACTIVITIES: CPT | Mod: GP | Performed by: PHYSICAL THERAPIST

## 2018-03-19 PROCEDURE — 97110 THERAPEUTIC EXERCISES: CPT | Mod: GP | Performed by: PHYSICAL THERAPIST

## 2018-03-19 NOTE — LETTER
"[facility]  [address]  [place] [address]  [phone]    2018    Re: Joby Crawford   :   1960  MRN:  [id number]   REFERRING PHYSICIAN:   [facility]    Date of Initial Evaluation:  17  Visits:  Rxs Used:   Reason for Referral:     Acute pain of left shoulder  S/P shoulder surgery     PROGRESS  REPORT  Progress reporting period is from 18 to 3/19/18.     SUBJECTIVE   Luis Fernando is \"about the same\" function, mobility and strength as a month ago.   SPADI has improved from  to .    He will be initiating work conditioning program this upcoming Friday for 4-6 weeks with functional capcity evaluation in conclusion of  the work conditioning program.      Current Pain level: 0/10   Initial Pain level: 8/10   Changes in function: Yes, see goal flow sheet for change in function   Adverse reactions: None    OBJECTIVE   L sidelying ER strength is 80-90% of the opposite side.   Overall ROM 90-95%.   Limited ER by roughly 20-30% compareatively. Based on the extent of surgery, ER has been purposely limited from end range stretch.       ASSESSMENT/PLAN  Updated problem list and treatment plan: Diagnosis 1:  L shoulder pain       STG/LTGs have been met or progress has been made towards goals:  Yes (See Goal flow sheet completed today.)  Assessment of Progress: The patient's condition is improving.  The patient's condition has potential to improve.  Self Management Plans:  Patient has been instructed in a home treatment program.  Patient is independent in a home treatment program.  I have re-evaluated this patient and find that the nature, scope, duration and intensity of the therapy is appropriate for the medical condition of the patient.    Recommendations:  This patient is ready to be discharged from therapy and continue their home treatment program.  This patient would benefit from further evaluation.    Thank you for your " referral.    INQUIRIES  Therapist: Jose L [facility]  [address]  [place] [address]  Phone: [phone]  Fax: [phone]

## 2018-03-19 NOTE — PROGRESS NOTES
"Subjective:  HPI                    Objective:  System    Physical Exam    General     ROS    Assessment/Plan:    PROGRESS  REPORT    Progress reporting period is from 2/26/18 to 3/19/18.     SUBJECTIVE   Luis Fernando is \"about the same\" function, mobility and strength as a month ago.     SPADI has improved from 19/100 to 17/100.      He will be initiating work conditioning program this upcoming Friday for 4-6 weeks with functional capcity evaluation in conclusion of  the work conditioning program.        Current Pain level: 0/10   Initial Pain level: 8/10   Changes in function: Yes, see goal flow sheet for change in function   Adverse reactions: None;   ,         OBJECTIVE   L sidelying ER strength is 80-90% of the opposite side.   Overall ROM 90-95%.   Limited ER by roughly 20-30% compareatively. Based on the extent of surgery, ER has been purposely limited from end range stretch.       ASSESSMENT/PLAN  Updated problem list and treatment plan: Diagnosis 1:  L shoulder pain       STG/LTGs have been met or progress has been made towards goals:  Yes (See Goal flow sheet completed today.)  Assessment of Progress: The patient's condition is improving.  The patient's condition has potential to improve.  Self Management Plans:  Patient has been instructed in a home treatment program.  Patient is independent in a home treatment program.  I have re-evaluated this patient and find that the nature, scope, duration and intensity of the therapy is appropriate for the medical condition of the patient.    Recommendations:  This patient is ready to be discharged from therapy and continue their home treatment program.  This patient would benefit from further evaluation.    Please refer to the daily flowsheet for treatment today, total treatment time and time spent performing 1:1 timed codes.      "

## 2018-10-17 PROBLEM — Z98.890 S/P SHOULDER SURGERY: Status: RESOLVED | Noted: 2017-05-18 | Resolved: 2018-10-17

## 2018-10-17 PROBLEM — M25.512 ACUTE PAIN OF LEFT SHOULDER: Status: RESOLVED | Noted: 2017-05-18 | Resolved: 2018-10-17
